# Patient Record
Sex: MALE | Race: WHITE | NOT HISPANIC OR LATINO | URBAN - METROPOLITAN AREA
[De-identification: names, ages, dates, MRNs, and addresses within clinical notes are randomized per-mention and may not be internally consistent; named-entity substitution may affect disease eponyms.]

---

## 2017-10-18 PROBLEM — 70153002 HEMORRHOIDS: Status: ACTIVE | Noted: 2017-10-18

## 2017-10-18 PROBLEM — 4556007 GASTRITIS: Status: ACTIVE | Noted: 2017-10-18

## 2017-10-18 PROBLEM — 235595009 GASTROESOPHAGEAL REFLUX DISEASE: Status: ACTIVE | Noted: 2017-10-18

## 2017-10-18 PROBLEM — 372130007 MALIGNANT NEOPLASM OF SKIN: Status: ACTIVE | Noted: 2017-10-18

## 2017-10-18 PROBLEM — 72007001 DUODENITIS: Status: ACTIVE | Noted: 2017-10-18

## 2017-10-18 PROBLEM — 37796009 MIGRAINE: Status: ACTIVE | Noted: 2017-10-18

## 2017-10-18 PROBLEM — 32914008 RESTLESS LEGS: Status: ACTIVE | Noted: 2017-10-18

## 2020-06-05 ENCOUNTER — LAB OUTSIDE AN ENCOUNTER (OUTPATIENT)
Dept: URBAN - METROPOLITAN AREA CLINIC 13 | Facility: CLINIC | Age: 65
End: 2020-06-05

## 2020-06-05 LAB
A/G RATIO: 2
ALBUMIN: (no result)
ALKALINE PHOSPHATASE: (no result)
ALT (SGPT): (no result)
AST (SGOT): (no result)
BILIRUBIN, TOTAL: (no result)
BUN/CREATININE RATIO: 12
BUN: (no result)
CALCIUM: (no result)
CARBON DIOXIDE, TOTAL: (no result)
CHLORIDE: (no result)
CREATININE: (no result)
EGFR IF AFRICN AM: (no result)
EGFR IF NONAFRICN AM: (no result)
FOLATE (FOLIC ACID), SERUM: (no result)
GLOBULIN, TOTAL: (no result)
GLUCOSE: (no result)
POTASSIUM: (no result)
PROTEIN, TOTAL: (no result)
SODIUM: (no result)
VITAMIN B12: (no result)
VITAMIN D, 25-HYDROXY: (no result)

## 2020-06-24 ENCOUNTER — OFFICE VISIT (OUTPATIENT)
Dept: URBAN - METROPOLITAN AREA CLINIC 13 | Facility: CLINIC | Age: 65
End: 2020-06-24

## 2020-07-21 ENCOUNTER — OFFICE VISIT (OUTPATIENT)
Dept: URBAN - METROPOLITAN AREA CLINIC 46 | Facility: CLINIC | Age: 65
End: 2020-07-21

## 2020-07-21 LAB — PDFREPORT1: (no result)

## 2020-07-23 ENCOUNTER — LAB OUTSIDE AN ENCOUNTER (OUTPATIENT)
Dept: URBAN - METROPOLITAN AREA CLINIC 13 | Facility: CLINIC | Age: 65
End: 2020-07-23

## 2020-07-28 ENCOUNTER — OFFICE VISIT (OUTPATIENT)
Dept: URBAN - METROPOLITAN AREA SURGERY CENTER 27 | Facility: SURGERY CENTER | Age: 65
End: 2020-07-28

## 2020-07-28 PROBLEM — 38341003 HYPERTENSION: Status: ACTIVE | Noted: 2020-07-28

## 2020-07-28 LAB — PDFREPORT1: (no result)

## 2020-07-31 ENCOUNTER — LAB OUTSIDE AN ENCOUNTER (OUTPATIENT)
Dept: URBAN - METROPOLITAN AREA CLINIC 13 | Facility: CLINIC | Age: 65
End: 2020-07-31

## 2020-08-13 ENCOUNTER — OFFICE VISIT (OUTPATIENT)
Dept: URBAN - METROPOLITAN AREA CLINIC 13 | Facility: CLINIC | Age: 65
End: 2020-08-13

## 2020-08-27 ENCOUNTER — OFFICE VISIT (OUTPATIENT)
Dept: URBAN - METROPOLITAN AREA CLINIC 44 | Facility: CLINIC | Age: 65
End: 2020-08-27

## 2020-11-19 ENCOUNTER — OFFICE VISIT (OUTPATIENT)
Dept: URBAN - METROPOLITAN AREA CLINIC 44 | Facility: CLINIC | Age: 65
End: 2020-11-19

## 2021-08-28 ENCOUNTER — TELEPHONE ENCOUNTER (OUTPATIENT)
Dept: URBAN - METROPOLITAN AREA CLINIC 13 | Facility: CLINIC | Age: 66
End: 2021-08-28

## 2021-08-28 RX ORDER — OMEPRAZOLE 40 MG/1
CAPSULE, DELAYED RELEASE ORAL
OUTPATIENT
End: 2020-07-28

## 2021-08-28 RX ORDER — HYOSCYAMINE SULFATE 0.12 MG/1
TABLET ORAL
OUTPATIENT
Start: 2018-05-01 | End: 2020-05-27

## 2021-08-28 RX ORDER — ROPINIROLE HYDROCHLORIDE 0.25 MG/1
TABLET, FILM COATED ORAL
OUTPATIENT
End: 2020-05-27

## 2021-08-28 RX ORDER — PANCRELIPASE 36000; 180000; 114000 [USP'U]/1; [USP'U]/1; [USP'U]/1
CAPSULE, DELAYED RELEASE PELLETS ORAL
OUTPATIENT
Start: 2020-05-27 | End: 2020-11-19

## 2021-08-29 ENCOUNTER — TELEPHONE ENCOUNTER (OUTPATIENT)
Dept: URBAN - METROPOLITAN AREA CLINIC 13 | Facility: CLINIC | Age: 66
End: 2021-08-29

## 2021-08-29 RX ORDER — FLUTICASONE PROPIONATE 50 UG/1
SPRAY, METERED NASAL
Status: ACTIVE | COMMUNITY

## 2021-08-29 RX ORDER — FAMOTIDINE 40 MG/1
TABLET, FILM COATED ORAL
Status: ACTIVE | COMMUNITY
Start: 2020-11-19

## 2021-08-29 RX ORDER — LINACLOTIDE 72 UG/1
CAPSULE, GELATIN COATED ORAL
Status: ACTIVE | COMMUNITY
Start: 2020-08-27

## 2021-08-29 RX ORDER — LISINOPRIL 5 MG/1
TABLET ORAL
Status: ACTIVE | COMMUNITY

## 2021-08-29 RX ORDER — BIFIDOBACTERIUM LONGUM 10MM CELL
CAPSULE ORAL
Status: ACTIVE | COMMUNITY

## 2021-08-29 RX ORDER — OMEPRAZOLE 20 MG/1
CAPSULE, DELAYED RELEASE ORAL
Status: ACTIVE | COMMUNITY
Start: 2020-05-27

## 2021-08-29 RX ORDER — SUCRALFATE 1 G/1
TABLET ORAL
Status: ACTIVE | COMMUNITY
Start: 2020-08-13

## 2021-10-06 ENCOUNTER — ERX REFILL RESPONSE (OUTPATIENT)
Dept: URBAN - METROPOLITAN AREA CLINIC 44 | Facility: CLINIC | Age: 66
End: 2021-10-06

## 2021-10-06 ENCOUNTER — TELEPHONE ENCOUNTER (OUTPATIENT)
Dept: URBAN - METROPOLITAN AREA CLINIC 46 | Facility: CLINIC | Age: 66
End: 2021-10-06

## 2021-10-06 RX ORDER — LINACLOTIDE 72 UG/1
1 CAPSULE AT LEAST 30 MINUTES BEFORE THE FIRST MEAL OF THE DAY ON AN EMPTY STOMACH CAPSULE, GELATIN COATED ORAL ONCE A DAY
Qty: 90 CAPSULE | Refills: 0 | OUTPATIENT

## 2021-10-06 RX ORDER — LINACLOTIDE 72 UG/1
TAKE 1 CAPSULE BY MOUTH 30 MIN BEFORE BREAKFAST CAPSULE, GELATIN COATED ORAL
Qty: 30 CAPSULE | Refills: 1 | OUTPATIENT

## 2021-10-06 RX ORDER — LINACLOTIDE 72 UG/1
1 CAPSULE AT LEAST 30 MINUTES BEFORE THE FIRST MEAL OF THE DAY ON AN EMPTY STOMACH CAPSULE, GELATIN COATED ORAL ONCE A DAY
Qty: 90 CAPSULE | Refills: 0
Start: 2020-08-27 | End: 2022-01-03

## 2021-10-07 ENCOUNTER — ERX REFILL RESPONSE (OUTPATIENT)
Dept: URBAN - METROPOLITAN AREA CLINIC 44 | Facility: CLINIC | Age: 66
End: 2021-10-07

## 2021-10-07 RX ORDER — LINACLOTIDE 72 UG/1
TAKE 1 CAPSULE BY MOUTH 30 MIN BEFORE BREAKFAST CAPSULE, GELATIN COATED ORAL
Qty: 30 CAPSULE | Refills: 1 | OUTPATIENT

## 2021-10-28 ENCOUNTER — WEB ENCOUNTER (OUTPATIENT)
Dept: URBAN - METROPOLITAN AREA CLINIC 44 | Facility: CLINIC | Age: 66
End: 2021-10-28

## 2021-10-29 ENCOUNTER — OFFICE VISIT (OUTPATIENT)
Dept: URBAN - METROPOLITAN AREA CLINIC 44 | Facility: CLINIC | Age: 66
End: 2021-10-29
Payer: COMMERCIAL

## 2021-10-29 VITALS
HEIGHT: 68 IN | DIASTOLIC BLOOD PRESSURE: 69 MMHG | SYSTOLIC BLOOD PRESSURE: 142 MMHG | WEIGHT: 161 LBS | HEART RATE: 61 BPM | TEMPERATURE: 98.7 F | BODY MASS INDEX: 24.4 KG/M2

## 2021-10-29 DIAGNOSIS — Z86.010 PERSONAL HISTORY OF COLONIC POLYPS: ICD-10-CM

## 2021-10-29 DIAGNOSIS — K44.9 HIATAL HERNIA: ICD-10-CM

## 2021-10-29 DIAGNOSIS — R10.13 EPIGASTRIC PRESSURE: ICD-10-CM

## 2021-10-29 PROBLEM — 428283002: Status: ACTIVE | Noted: 2021-10-29

## 2021-10-29 PROBLEM — 84089009 HIATAL HERNIA: Status: ACTIVE | Noted: 2017-10-18

## 2021-10-29 PROCEDURE — 99213 OFFICE O/P EST LOW 20 MIN: CPT | Performed by: INTERNAL MEDICINE

## 2021-10-29 RX ORDER — LINACLOTIDE 72 UG/1
TAKE 1 CAPSULE BY MOUTH 30 MIN BEFORE BREAKFAST CAPSULE, GELATIN COATED ORAL ONCE A DAY
Qty: 90 | Refills: 4 | OUTPATIENT

## 2021-10-29 RX ORDER — SUCRALFATE 1 G/1
TABLET ORAL
Status: ACTIVE | COMMUNITY
Start: 2020-08-13

## 2021-10-29 RX ORDER — FAMOTIDINE 40 MG/1
TABLET, FILM COATED ORAL
Status: ACTIVE | COMMUNITY
Start: 2020-11-19

## 2021-10-29 RX ORDER — LISINOPRIL 5 MG/1
TABLET ORAL
Status: ACTIVE | COMMUNITY

## 2021-10-29 RX ORDER — FLUTICASONE PROPIONATE 50 UG/1
SPRAY, METERED NASAL
Status: ACTIVE | COMMUNITY

## 2021-10-29 RX ORDER — LINACLOTIDE 72 UG/1
TAKE 1 CAPSULE BY MOUTH 30 MIN BEFORE BREAKFAST CAPSULE, GELATIN COATED ORAL
Qty: 30 CAPSULE | Refills: 1 | Status: ACTIVE | COMMUNITY

## 2021-10-29 RX ORDER — FAMOTIDINE 40 MG/1
1 TABLET TABLET, FILM COATED ORAL TWICE A DAY
Qty: 60 TABLET | Refills: 4 | OUTPATIENT
Start: 2020-11-19

## 2021-10-29 RX ORDER — BIFIDOBACTERIUM LONGUM 10MM CELL
CAPSULE ORAL
Status: ACTIVE | COMMUNITY

## 2021-10-29 NOTE — HPI-GERD
66 year old male with a personal history of adenomatous polyps, esophagitis. EGD 7/28/2020 showed esophagitis, fundic gland polyps and H Pylori negative gastritis. Last colonoscopy was 11/28/17 at which time 2 small adenomas were removed. Repeat colon advised in 5 years.   I saw him for follow up 8/27/20 and stopped the Creon. He stopped the Carafate on his own as he was not sure it was helping. He is taking Linzess 72 mcg. He has been holding it when he is unable to get to a bathroom easily. Currently, taking Linzess 2 to 3 times a week and this is working. He has intermittent sensation of being punched in the epigastric region. He has been having this discomfort every day this past week. Has not tried Carafate or any OTC meds. His mother-in-law is in the Hospital. She is quite sick with a fib, CHF, bleeding AVMs. He is visiting the Hospital frequently and worrying about his diabetic wife.   The bloating is better. Has some bloating with meals, but better on Linzess. He has had this 40 years. He takes Align. He had a right inguinal hernia repaired 1/24/20. Follow-up exam with Dr Khan 2/2020 showed good healing, no recurrent hernia or infection. Picking up heavy objects bothers him. EGD (5/2012) revealed hiatal hernia. Last colon was November 2017 at which time a small adenoma was removed. EGD 7/28/2020 showed esophagitis, fundic gland polyps and H Pylori negative gastritis.  Labs (CBC and CMP) in April 2021 were normal

## 2022-03-08 ENCOUNTER — OFFICE VISIT (OUTPATIENT)
Dept: URBAN - METROPOLITAN AREA CLINIC 44 | Facility: CLINIC | Age: 67
End: 2022-03-08
Payer: COMMERCIAL

## 2022-03-08 ENCOUNTER — LAB OUTSIDE AN ENCOUNTER (OUTPATIENT)
Dept: URBAN - METROPOLITAN AREA CLINIC 44 | Facility: CLINIC | Age: 67
End: 2022-03-08

## 2022-03-08 VITALS
BODY MASS INDEX: 25.91 KG/M2 | TEMPERATURE: 97.9 F | HEIGHT: 68 IN | DIASTOLIC BLOOD PRESSURE: 78 MMHG | SYSTOLIC BLOOD PRESSURE: 130 MMHG | WEIGHT: 171 LBS | HEART RATE: 54 BPM

## 2022-03-08 DIAGNOSIS — R10.13 DYSPEPSIA: ICD-10-CM

## 2022-03-08 PROCEDURE — 99203 OFFICE O/P NEW LOW 30 MIN: CPT | Performed by: INTERNAL MEDICINE

## 2022-03-08 RX ORDER — FAMOTIDINE 40 MG/1
1 TABLET TABLET, FILM COATED ORAL TWICE A DAY
Qty: 60 TABLET | Refills: 4 | COMMUNITY
Start: 2020-11-19

## 2022-03-08 RX ORDER — LINACLOTIDE 72 UG/1
TAKE 1 CAPSULE BY MOUTH 30 MIN BEFORE BREAKFAST CAPSULE, GELATIN COATED ORAL
Refills: 4 | COMMUNITY

## 2022-03-08 RX ORDER — FLUTICASONE PROPIONATE 50 UG/1
SPRAY, METERED NASAL
COMMUNITY

## 2022-03-08 RX ORDER — LISINOPRIL 2.5 MG/1
1 TABLET TABLET ORAL ONCE A DAY
COMMUNITY

## 2022-03-08 RX ORDER — BIFIDOBACTERIUM LONGUM 10MM CELL
1 CAPSULE CAPSULE ORAL
COMMUNITY

## 2022-03-08 RX ORDER — ONDANSETRON HYDROCHLORIDE 4 MG/1
1 TABLET TABLET, FILM COATED ORAL ONCE A DAY
Qty: 30 | OUTPATIENT
Start: 2022-03-08

## 2022-03-08 NOTE — HPI-GERD
66 year old male  presents for an OV to discuss dyspepsia. The patient mentions chronic abdominal discomfort with nausea and intermittent vomiting after most meals. Associates bloating and has constipation that is controlled with trials of Linzess.  Work up for this involved an EGD (5/2012) which revealed a hiatal hernia.  EGD 7/28/2020 showed esophagitis, fundic gland polyps and H Pylori negative gastritis.  Labs (CBC and CMP) in April 2021 were normal. He is not diabetic, denies abdominal surgery or takes pain medication. His symptoms have been ongoing for several years and he has tried several OTC anti-gas medications such as gas ex, Mylicon, Pepto Bismol and antiemetics such as Zofran. He thinks he has lost about 10 lbs over the years and feels his anxiety may be playing a part as well.

## 2022-03-16 ENCOUNTER — TELEPHONE ENCOUNTER (OUTPATIENT)
Dept: URBAN - METROPOLITAN AREA CLINIC 46 | Facility: CLINIC | Age: 67
End: 2022-03-16

## 2022-03-28 ENCOUNTER — TELEPHONE ENCOUNTER (OUTPATIENT)
Dept: URBAN - METROPOLITAN AREA CLINIC 92 | Facility: CLINIC | Age: 67
End: 2022-03-28

## 2022-03-28 RX ORDER — FLUTICASONE PROPIONATE 50 UG/1
SPRAY, METERED NASAL
COMMUNITY

## 2022-03-28 RX ORDER — LINACLOTIDE 72 UG/1
TAKE 1 CAPSULE BY MOUTH 30 MIN BEFORE BREAKFAST CAPSULE, GELATIN COATED ORAL
Refills: 4 | COMMUNITY

## 2022-03-28 RX ORDER — BIFIDOBACTERIUM LONGUM 10MM CELL
1 CAPSULE CAPSULE ORAL
COMMUNITY

## 2022-03-28 RX ORDER — METOCLOPRAMIDE HYDROCHLORIDE 15 MG/.07ML
1 SPRAY 30 MINUTES BEFORE MEALS AND AT BEDTIME IN ONE NOSTRIL SPRAY NASAL
OUTPATIENT
Start: 2022-03-28

## 2022-03-28 RX ORDER — ONDANSETRON HYDROCHLORIDE 4 MG/1
1 TABLET TABLET, FILM COATED ORAL ONCE A DAY
Qty: 30 | Status: ACTIVE | COMMUNITY
Start: 2022-03-08

## 2022-03-28 RX ORDER — FAMOTIDINE 40 MG/1
1 TABLET TABLET, FILM COATED ORAL TWICE A DAY
Qty: 60 TABLET | Refills: 4 | COMMUNITY
Start: 2020-11-19

## 2022-03-28 RX ORDER — LISINOPRIL 2.5 MG/1
1 TABLET TABLET ORAL ONCE A DAY
COMMUNITY

## 2022-03-31 ENCOUNTER — WEB ENCOUNTER (OUTPATIENT)
Dept: URBAN - METROPOLITAN AREA CLINIC 44 | Facility: CLINIC | Age: 67
End: 2022-03-31

## 2022-05-03 ENCOUNTER — OFFICE VISIT (OUTPATIENT)
Dept: URBAN - METROPOLITAN AREA CLINIC 44 | Facility: CLINIC | Age: 67
End: 2022-05-03
Payer: COMMERCIAL

## 2022-05-03 ENCOUNTER — OFFICE VISIT (OUTPATIENT)
Dept: URBAN - METROPOLITAN AREA CLINIC 44 | Facility: CLINIC | Age: 67
End: 2022-05-03

## 2022-05-03 VITALS
BODY MASS INDEX: 24.67 KG/M2 | WEIGHT: 162.8 LBS | SYSTOLIC BLOOD PRESSURE: 113 MMHG | DIASTOLIC BLOOD PRESSURE: 70 MMHG | TEMPERATURE: 97.3 F | HEIGHT: 68 IN | HEART RATE: 54 BPM

## 2022-05-03 DIAGNOSIS — K31.84 GASTROPARESIS: ICD-10-CM

## 2022-05-03 DIAGNOSIS — R10.13 DYSPEPSIA: ICD-10-CM

## 2022-05-03 PROCEDURE — 99213 OFFICE O/P EST LOW 20 MIN: CPT | Performed by: INTERNAL MEDICINE

## 2022-05-03 RX ORDER — ONDANSETRON HYDROCHLORIDE 4 MG/1
1 TABLET TABLET, FILM COATED ORAL ONCE A DAY
Qty: 30 | Status: DISCONTINUED | COMMUNITY
Start: 2022-03-08

## 2022-05-03 RX ORDER — FLUTICASONE PROPIONATE 50 UG/1
SPRAY, METERED NASAL
Status: ACTIVE | COMMUNITY

## 2022-05-03 RX ORDER — LINACLOTIDE 72 UG/1
TAKE 1 CAPSULE BY MOUTH 30 MIN BEFORE BREAKFAST CAPSULE, GELATIN COATED ORAL
Refills: 4 | Status: ACTIVE | COMMUNITY

## 2022-05-03 RX ORDER — PROCHLORPERAZINE MALEATE 5 MG/1
1 TABLET AS NEEDED TABLET ORAL THREE TIMES A DAY
Qty: 180 TABLET | Refills: 1 | OUTPATIENT

## 2022-05-03 RX ORDER — FAMOTIDINE 40 MG/1
1 TABLET TABLET, FILM COATED ORAL TWICE A DAY
Qty: 60 TABLET | Refills: 4 | Status: ACTIVE | COMMUNITY
Start: 2020-11-19

## 2022-05-03 RX ORDER — LISINOPRIL 2.5 MG/1
1 TABLET TABLET ORAL ONCE A DAY
Status: ACTIVE | COMMUNITY

## 2022-05-03 RX ORDER — BIFIDOBACTERIUM LONGUM 10MM CELL
1 CAPSULE CAPSULE ORAL
Status: ACTIVE | COMMUNITY

## 2022-05-03 NOTE — HPI-GERD
66 year old male  presents for an OV to discuss dyspepsia with recent evidence of gastroparesis on GES. We discussed use of prokinetic agents but he read some side effect profiles and wishes to try lifestyle modification.   The patient mentions chronic abdominal discomfort with nausea and intermittent vomiting after most meals. Associates bloating and has constipation that is controlled with trials of Linzess.  He tried blended meals, smaller portioned meals and spaced out the timing of meals.  This did not help.  We also tried Zofran but this caused constipation.   Work up for this involved an EGD (5/2012) which revealed a hiatal hernia.  EGD 7/28/2020 showed esophagitis, fundic gland polyps and H Pylori negative gastritis.  Labs (CBC and CMP) in April 2021 were normal. He is not diabetic, denies abdominal surgery or takes pain medication. His symptoms have been ongoing for several years and he has tried several OTC anti-gas medications such as gas ex, Mylicon, Pepto Bismol.  He thinks he has lost about 10 lbs over the years and feels his anxiety may be playing a part as well.  Today, we revisited the idea of different medications to try including other antiemetics and prokinetics.

## 2022-07-12 ENCOUNTER — OFFICE VISIT (OUTPATIENT)
Dept: URBAN - METROPOLITAN AREA CLINIC 44 | Facility: CLINIC | Age: 67
End: 2022-07-12
Payer: COMMERCIAL

## 2022-07-12 VITALS
HEART RATE: 55 BPM | SYSTOLIC BLOOD PRESSURE: 125 MMHG | WEIGHT: 162.8 LBS | HEIGHT: 68 IN | DIASTOLIC BLOOD PRESSURE: 67 MMHG | TEMPERATURE: 98.1 F | BODY MASS INDEX: 24.67 KG/M2

## 2022-07-12 DIAGNOSIS — K31.84 GASTROPARESIS: ICD-10-CM

## 2022-07-12 DIAGNOSIS — R10.13 DYSPEPSIA: ICD-10-CM

## 2022-07-12 PROCEDURE — 99214 OFFICE O/P EST MOD 30 MIN: CPT | Performed by: INTERNAL MEDICINE

## 2022-07-12 RX ORDER — PROCHLORPERAZINE MALEATE 5 MG/1
1 TABLET AS NEEDED TABLET ORAL THREE TIMES A DAY
Qty: 180 TABLET | Refills: 1 | Status: ACTIVE | COMMUNITY

## 2022-07-12 RX ORDER — PEPPERMINT OIL 90 MG
AS DIRECTED CAPSULE, DELAYED, AND EXTENDED RELEASE ORAL
Status: ACTIVE | COMMUNITY

## 2022-07-12 RX ORDER — LISINOPRIL 2.5 MG/1
1 TABLET TABLET ORAL ONCE A DAY
Status: ACTIVE | COMMUNITY

## 2022-07-12 RX ORDER — FAMOTIDINE 20 MG/1
1 TABLET TABLET, FILM COATED ORAL TWICE A DAY
Status: ACTIVE | COMMUNITY

## 2022-07-12 RX ORDER — FLUTICASONE PROPIONATE 50 UG/1
SPRAY, METERED NASAL
Status: ACTIVE | COMMUNITY

## 2022-07-12 RX ORDER — AMITRIPTYLINE HYDROCHLORIDE 25 MG/1
1 TABLET AT BEDTIME TABLET, FILM COATED ORAL ONCE A DAY
Qty: 60 TABLET | Refills: 2 | OUTPATIENT
Start: 2022-07-12

## 2022-07-12 RX ORDER — PROCHLORPERAZINE MALEATE 5 MG/1
1 TABLET AS NEEDED TABLET ORAL THREE TIMES A DAY
Qty: 180 TABLET | Refills: 1 | OUTPATIENT

## 2022-07-12 RX ORDER — LINACLOTIDE 72 UG/1
TAKE 1 CAPSULE BY MOUTH 30 MIN BEFORE BREAKFAST CAPSULE, GELATIN COATED ORAL
Refills: 4 | Status: ACTIVE | COMMUNITY

## 2022-07-12 NOTE — HPI-GERD
67 year old male returns for an OV to discuss dyspepsia with recent evidence of gastroparesis on GES. We discussed use of prokinetic agents, but he read some side effect profiles and wished to try lifestyle modification. However, on our last OV he wished to revisit this idea and then decided to try an antiemetic. We initiated  prochlorperazine 5 mg but the patient admits to only taking the medication prn.  He admits to "playing doctor" and has significant anxiety with trying medications. He continues to have nausea and abdominal cramping mostly at night.   Today, he mentions that he continues to take FD guard and Pepcid for abdominal dyspepsia. He eats dinner around 5 or 6 and goes to bed around 10 or so.   He admits that anxiety is a problem, and he thinks about his chronic condition greatly.   Work up for this involved an EGD (5/2012) which revealed a hiatal hernia.  EGD 7/28/2020 showed esophagitis, fundic gland polyps and H Pylori negative gastritis.  Labs (CBC and CMP) in April 2021 were normal. He is not diabetic, denies abdominal surgery or takes pain medication. His symptoms have been ongoing for several years, and he has tried several OTC anti-gas medications such as gas ex, Mylicon, Pepto Bismol.  He thinks he has lost about 10 lbs over the years and feels his anxiety may be playing a part as well.

## 2022-09-07 ENCOUNTER — ERX REFILL RESPONSE (OUTPATIENT)
Dept: URBAN - METROPOLITAN AREA CLINIC 44 | Facility: CLINIC | Age: 67
End: 2022-09-07

## 2022-09-07 RX ORDER — AMITRIPTYLINE HYDROCHLORIDE 25 MG/1
1 TABLET AT BEDTIME TABLET, FILM COATED ORAL ONCE A DAY
Qty: 60 TABLET | Refills: 2 | OUTPATIENT

## 2022-09-07 RX ORDER — AMITRIPTYLINE HYDROCHLORIDE 25 MG/1
1 TABLET AT BEDTIME ORALLY ONCE A DAY 60 DAYS TABLET, FILM COATED ORAL
Qty: 60 TABLET | Refills: 2 | OUTPATIENT

## 2022-09-13 ENCOUNTER — OFFICE VISIT (OUTPATIENT)
Dept: URBAN - METROPOLITAN AREA CLINIC 44 | Facility: CLINIC | Age: 67
End: 2022-09-13

## 2022-09-13 RX ORDER — PROCHLORPERAZINE MALEATE 5 MG/1
1 TABLET AS NEEDED TABLET ORAL THREE TIMES A DAY
Qty: 180 TABLET | Refills: 1 | COMMUNITY

## 2022-09-13 RX ORDER — LINACLOTIDE 72 UG/1
TAKE 1 CAPSULE BY MOUTH 30 MIN BEFORE BREAKFAST CAPSULE, GELATIN COATED ORAL
Refills: 4 | COMMUNITY

## 2022-09-13 RX ORDER — FAMOTIDINE 20 MG/1
1 TABLET TABLET, FILM COATED ORAL TWICE A DAY
COMMUNITY

## 2022-09-13 RX ORDER — PEPPERMINT OIL 90 MG
AS DIRECTED CAPSULE, DELAYED, AND EXTENDED RELEASE ORAL
COMMUNITY

## 2022-09-13 RX ORDER — FLUTICASONE PROPIONATE 50 UG/1
SPRAY, METERED NASAL
COMMUNITY

## 2022-09-13 RX ORDER — AMITRIPTYLINE HYDROCHLORIDE 25 MG/1
1 TABLET AT BEDTIME ORALLY ONCE A DAY 60 DAYS TABLET, FILM COATED ORAL
Qty: 60 TABLET | Refills: 2 | COMMUNITY

## 2022-09-13 RX ORDER — LISINOPRIL 2.5 MG/1
1 TABLET TABLET ORAL ONCE A DAY
COMMUNITY

## 2022-12-26 ENCOUNTER — WEB ENCOUNTER (OUTPATIENT)
Dept: URBAN - METROPOLITAN AREA CLINIC 44 | Facility: CLINIC | Age: 67
End: 2022-12-26

## 2022-12-26 RX ORDER — LINACLOTIDE 72 UG/1
TAKE 1 CAPSULE BY MOUTH 30 MIN BEFORE BREAKFAST CAPSULE, GELATIN COATED ORAL ONCE A DAY
Qty: 90 CAPSULE | Refills: 3

## 2022-12-30 ENCOUNTER — OFFICE VISIT (OUTPATIENT)
Dept: URBAN - METROPOLITAN AREA CLINIC 46 | Facility: CLINIC | Age: 67
End: 2022-12-30
Payer: COMMERCIAL

## 2022-12-30 ENCOUNTER — LAB OUTSIDE AN ENCOUNTER (OUTPATIENT)
Dept: URBAN - METROPOLITAN AREA CLINIC 46 | Facility: CLINIC | Age: 67
End: 2022-12-30

## 2022-12-30 VITALS
DIASTOLIC BLOOD PRESSURE: 64 MMHG | SYSTOLIC BLOOD PRESSURE: 123 MMHG | WEIGHT: 98.2 LBS | HEART RATE: 61 BPM | HEIGHT: 68 IN | BODY MASS INDEX: 14.88 KG/M2 | TEMPERATURE: 98.2 F

## 2022-12-30 DIAGNOSIS — R10.13 DYSPEPSIA: ICD-10-CM

## 2022-12-30 DIAGNOSIS — D12.4 BENIGN NEOPLASM OF DESCENDING COLON: ICD-10-CM

## 2022-12-30 DIAGNOSIS — K31.84 GASTROPARESIS: ICD-10-CM

## 2022-12-30 PROBLEM — 235675006: Status: ACTIVE | Noted: 2022-05-03

## 2022-12-30 PROCEDURE — 99214 OFFICE O/P EST MOD 30 MIN: CPT | Performed by: INTERNAL MEDICINE

## 2022-12-30 RX ORDER — PEPPERMINT OIL 90 MG
AS DIRECTED CAPSULE, DELAYED, AND EXTENDED RELEASE ORAL
Status: ACTIVE | COMMUNITY

## 2022-12-30 RX ORDER — FAMOTIDINE 20 MG/1
1 TABLET TABLET, FILM COATED ORAL TWICE A DAY
Status: ACTIVE | COMMUNITY

## 2022-12-30 RX ORDER — SODIUM SULFATE, MAGNESIUM SULFATE, AND POTASSIUM CHLORIDE 17.75; 2.7; 2.25 G/1; G/1; G/1
12 TABLETS TABLET ORAL
Qty: 24 TABLETS | Refills: 0 | OUTPATIENT
Start: 2022-12-30 | End: 2022-12-31

## 2022-12-30 RX ORDER — PROCHLORPERAZINE MALEATE 5 MG/1
1 TABLET AS NEEDED TABLET ORAL THREE TIMES A DAY
Qty: 180 TABLET | Refills: 1 | OUTPATIENT

## 2022-12-30 RX ORDER — LINACLOTIDE 72 UG/1
TAKE 1 CAPSULE BY MOUTH 30 MIN BEFORE BREAKFAST CAPSULE, GELATIN COATED ORAL ONCE A DAY
Qty: 90 CAPSULE | Refills: 3 | Status: ACTIVE | COMMUNITY

## 2022-12-30 RX ORDER — FLUTICASONE PROPIONATE 50 UG/1
SPRAY, METERED NASAL
Status: ACTIVE | COMMUNITY

## 2022-12-30 RX ORDER — LISINOPRIL 2.5 MG/1
1 TABLET TABLET ORAL ONCE A DAY
Status: ACTIVE | COMMUNITY

## 2022-12-30 NOTE — HPI-GERD
67 year old male returns for an OV to discuss chronic gastroparesis with dyspepsia symptoms of nausea and vomiting. We have discussed treatment options with the patient but over the last few visits he as elected not to start medications.   We discussed use of prokinetic agents, but he read some side effect profiles and wished to try lifestyle modification. However, on our last OV he wished to revisit this idea and then decided to try an antiemetic. We initiated  prochlorperazine 5 mg but the patient admits to not taking the medication.  He admits to "playing doctor" and has significant anxiety with trying medications. He continues to have nausea and abdominal cramping mostly at night. He only takes FD guard intermittently with mixed results.   Today, he mentions severe anxiety and family stressors. He admits that anxiety is a problem, and he thinks about his chronic condition greatly. He continues to take Linzess 72 mcg with good results(daily BM's).    Work up for this involved an EGD (5/2012) which revealed a hiatal hernia.  EGD 7/28/2020 showed esophagitis, fundic gland polyps and H Pylori negative gastritis.  Labs (CBC and CMP) in April 2021 were normal. He is not diabetic, denies abdominal surgery or takes pain medication. His symptoms have been ongoing for several years, and he has tried several OTC anti-gas medications such as gas ex, Mylicon, Pepto Bismol.  He thinks he has lost about 10 lbs over the years and feels his anxiety may be playing a part as well.

## 2023-01-10 ENCOUNTER — OFFICE VISIT (OUTPATIENT)
Dept: URBAN - METROPOLITAN AREA SURGERY CENTER 28 | Facility: SURGERY CENTER | Age: 68
End: 2023-01-10

## 2023-01-19 ENCOUNTER — OFFICE VISIT (OUTPATIENT)
Dept: URBAN - METROPOLITAN AREA SURGERY CENTER 27 | Facility: SURGERY CENTER | Age: 68
End: 2023-01-19
Payer: COMMERCIAL

## 2023-01-19 DIAGNOSIS — Z09 CARDIOLOGY FOLLOW-UP ENCOUNTER: ICD-10-CM

## 2023-01-19 DIAGNOSIS — Z86.010 ADENOMAS PERSONAL HISTORY OF COLONIC POLYPS: ICD-10-CM

## 2023-01-19 PROCEDURE — G0105 COLORECTAL SCRN; HI RISK IND: HCPCS | Performed by: INTERNAL MEDICINE

## 2023-01-19 PROCEDURE — G8907 PT DOC NO EVENTS ON DISCHARG: HCPCS | Performed by: INTERNAL MEDICINE

## 2023-01-19 RX ORDER — PEPPERMINT OIL 90 MG
AS DIRECTED CAPSULE, DELAYED, AND EXTENDED RELEASE ORAL
Status: ACTIVE | COMMUNITY

## 2023-01-19 RX ORDER — FLUTICASONE PROPIONATE 50 UG/1
SPRAY, METERED NASAL
Status: ACTIVE | COMMUNITY

## 2023-01-19 RX ORDER — FAMOTIDINE 20 MG/1
1 TABLET TABLET, FILM COATED ORAL TWICE A DAY
Status: ACTIVE | COMMUNITY

## 2023-01-19 RX ORDER — PROCHLORPERAZINE MALEATE 5 MG/1
1 TABLET AS NEEDED TABLET ORAL THREE TIMES A DAY
Qty: 180 TABLET | Refills: 1 | Status: ACTIVE | COMMUNITY

## 2023-01-19 RX ORDER — LINACLOTIDE 72 UG/1
TAKE 1 CAPSULE BY MOUTH 30 MIN BEFORE BREAKFAST CAPSULE, GELATIN COATED ORAL ONCE A DAY
Qty: 90 CAPSULE | Refills: 3 | Status: ACTIVE | COMMUNITY

## 2023-01-19 RX ORDER — LISINOPRIL 2.5 MG/1
1 TABLET TABLET ORAL ONCE A DAY
Status: ACTIVE | COMMUNITY

## 2023-01-25 ENCOUNTER — ERX REFILL RESPONSE (OUTPATIENT)
Dept: URBAN - METROPOLITAN AREA CLINIC 44 | Facility: CLINIC | Age: 68
End: 2023-01-25

## 2023-01-25 RX ORDER — LINACLOTIDE 72 UG/1
TAKE 1 CAPSULE BY MOUTH 30 MIN BEFORE BREAKFAST CAPSULE, GELATIN COATED ORAL ONCE A DAY
Qty: 90 CAPSULE | Refills: 3 | OUTPATIENT

## 2023-01-25 RX ORDER — LINACLOTIDE 72 UG/1
TAKE 1 CAPSULE BY MOUTH 30 MIN BEFORE BREAKFAST CAPSULE, GELATIN COATED ORAL ONCE A DAY
Qty: 90 CAPSULE | Refills: 0 | OUTPATIENT

## 2023-03-29 ENCOUNTER — WEB ENCOUNTER (OUTPATIENT)
Dept: URBAN - METROPOLITAN AREA CLINIC 44 | Facility: CLINIC | Age: 68
End: 2023-03-29

## 2023-03-30 ENCOUNTER — WEB ENCOUNTER (OUTPATIENT)
Dept: URBAN - METROPOLITAN AREA CLINIC 44 | Facility: CLINIC | Age: 68
End: 2023-03-30

## 2023-04-21 ENCOUNTER — OFFICE VISIT (OUTPATIENT)
Dept: URBAN - METROPOLITAN AREA CLINIC 46 | Facility: CLINIC | Age: 68
End: 2023-04-21
Payer: COMMERCIAL

## 2023-04-21 VITALS
DIASTOLIC BLOOD PRESSURE: 54 MMHG | TEMPERATURE: 98.2 F | SYSTOLIC BLOOD PRESSURE: 122 MMHG | HEART RATE: 65 BPM | HEIGHT: 68 IN | BODY MASS INDEX: 24.58 KG/M2 | WEIGHT: 162.2 LBS | OXYGEN SATURATION: 99 %

## 2023-04-21 DIAGNOSIS — K31.84 GASTROPARESIS: ICD-10-CM

## 2023-04-21 DIAGNOSIS — K58.1 IRRITABLE BOWEL SYNDROME WITH CONSTIPATION: ICD-10-CM

## 2023-04-21 PROBLEM — 440630006: Status: ACTIVE | Noted: 2023-04-21

## 2023-04-21 PROCEDURE — 99213 OFFICE O/P EST LOW 20 MIN: CPT | Performed by: INTERNAL MEDICINE

## 2023-04-21 RX ORDER — LINACLOTIDE 72 UG/1
TAKE 1 CAPSULE BY MOUTH 30 MIN BEFORE BREAKFAST CAPSULE, GELATIN COATED ORAL ONCE A DAY
Qty: 90 CAPSULE | Refills: 0 | Status: ACTIVE | COMMUNITY

## 2023-04-21 RX ORDER — CHOLECALCIFEROL (VITAMIN D3) 50 MCG
1 TABLET TABLET ORAL ONCE A DAY
Status: ACTIVE | COMMUNITY

## 2023-04-21 RX ORDER — LISINOPRIL 2.5 MG/1
1 TABLET TABLET ORAL ONCE A DAY
Status: ACTIVE | COMMUNITY

## 2023-04-21 RX ORDER — PROCHLORPERAZINE MALEATE 5 MG/1
1 TABLET AS NEEDED TABLET ORAL THREE TIMES A DAY
Qty: 180 TABLET | Refills: 1 | Status: ACTIVE | COMMUNITY

## 2023-04-21 RX ORDER — PEPPERMINT OIL 90 MG
AS DIRECTED CAPSULE, DELAYED, AND EXTENDED RELEASE ORAL
Status: ACTIVE | COMMUNITY

## 2023-04-21 RX ORDER — FAMOTIDINE 20 MG/1
1 TABLET TABLET, FILM COATED ORAL TWICE A DAY
Status: ACTIVE | COMMUNITY

## 2023-04-21 NOTE — HPI-GERD
67 year old male returns for an OV to discuss chronic gastroparesis with dyspepsia symptoms of nausea and vomiting. We have discussed treatment options with the patient over several months;however, he has been hesistant to start recommended treatment due to percieved side effects.     We discussed use of prokinetic agents, but he read the side effect profiles and wished to try lifestyle modification. However, on subsequent visits he decided to try an antiemetic. We initiated  prochlorperazine 5 mg but the patient admited to not taking the medication.  He has significant anxiety with new medications but he continues to have nausea and abdominal cramping mostly at night.  Trials of FD guard taken intermittently has mixed results.   On the last visit, he agreed to try prochlorperazine 5 mg x bid and has done remarkably well. However, he states that he was off for a two weeks and felt worsening symptoms. He mentions that he has been having He continues to take Linzess 72 mcg on T, Th, Sat with mixed results. Endorses cramping but has BMs on the days he takes the medication.    Brief history with work up : EGD (5/2012) which revealed a hiatal hernia.   EGD 7/28/2020 showed esophagitis, fundic gland polyps and H Pylori negative gastritis.  CBC and CMP) in April 2021 were normal. He is not diabetic, denies abdominal surgery or takes pain medication. His symptoms have been ongoing for several years, and he has tried several OTC anti-gas medications such as gas ex, Mylicon, Pepto Bismol.

## 2023-06-07 ENCOUNTER — WEB ENCOUNTER (OUTPATIENT)
Dept: URBAN - METROPOLITAN AREA CLINIC 46 | Facility: CLINIC | Age: 68
End: 2023-06-07

## 2023-06-07 RX ORDER — LINACLOTIDE 72 UG/1
TAKE 1 CAPSULE BY MOUTH 30 MIN BEFORE BREAKFAST CAPSULE, GELATIN COATED ORAL ONCE A DAY
Qty: 90 CAPSULE | Refills: 0 | Status: ACTIVE | COMMUNITY

## 2023-06-07 RX ORDER — FAMOTIDINE 20 MG/1
1 TABLET TABLET, FILM COATED ORAL TWICE A DAY
Status: ACTIVE | COMMUNITY

## 2023-06-07 RX ORDER — TAMSULOSIN HYDROCHLORIDE 0.4 MG/1
1 CAPSULE CAPSULE ORAL ONCE A DAY
Qty: 30 CAPSULE | Status: ACTIVE | COMMUNITY

## 2023-06-07 RX ORDER — LISINOPRIL 2.5 MG/1
1 TABLET TABLET ORAL ONCE A DAY
Status: ACTIVE | COMMUNITY

## 2023-06-07 RX ORDER — PEPPERMINT OIL 90 MG
AS DIRECTED CAPSULE, DELAYED, AND EXTENDED RELEASE ORAL
Status: ACTIVE | COMMUNITY

## 2023-06-07 RX ORDER — CHOLECALCIFEROL (VITAMIN D3) 50 MCG
1 TABLET TABLET ORAL ONCE A DAY
Status: ACTIVE | COMMUNITY

## 2023-06-07 RX ORDER — PROCHLORPERAZINE MALEATE 5 MG/1
1 TABLET AS NEEDED TABLET ORAL THREE TIMES A DAY
Qty: 180 TABLET | Refills: 1 | Status: ACTIVE | COMMUNITY

## 2023-06-09 ENCOUNTER — LAB OUTSIDE AN ENCOUNTER (OUTPATIENT)
Dept: URBAN - METROPOLITAN AREA CLINIC 46 | Facility: CLINIC | Age: 68
End: 2023-06-09

## 2023-06-09 PROBLEM — 87522002: Status: ACTIVE | Noted: 2023-06-09

## 2023-06-22 ENCOUNTER — CLAIMS CREATED FROM THE CLAIM WINDOW (OUTPATIENT)
Dept: URBAN - METROPOLITAN AREA SURGERY CENTER 27 | Facility: SURGERY CENTER | Age: 68
End: 2023-06-22

## 2023-06-22 ENCOUNTER — TELEPHONE ENCOUNTER (OUTPATIENT)
Dept: URBAN - METROPOLITAN AREA CLINIC 44 | Facility: CLINIC | Age: 68
End: 2023-06-22

## 2023-06-22 ENCOUNTER — CLAIMS CREATED FROM THE CLAIM WINDOW (OUTPATIENT)
Dept: URBAN - METROPOLITAN AREA CLINIC 4 | Facility: CLINIC | Age: 68
End: 2023-06-22
Payer: COMMERCIAL

## 2023-06-22 ENCOUNTER — CLAIMS CREATED FROM THE CLAIM WINDOW (OUTPATIENT)
Dept: URBAN - METROPOLITAN AREA SURGERY CENTER 27 | Facility: SURGERY CENTER | Age: 68
End: 2023-06-22
Payer: COMMERCIAL

## 2023-06-22 DIAGNOSIS — K29.70 CHRONIC ACITVE GASTRITIS (H.PYLORI NEGATIVE): ICD-10-CM

## 2023-06-22 DIAGNOSIS — K92.2 ACUTE GASTROINTESTINAL BLEEDING: ICD-10-CM

## 2023-06-22 DIAGNOSIS — D12.5 ADENOMA OF SIGMOID COLON: ICD-10-CM

## 2023-06-22 DIAGNOSIS — Z12.11 COLON CANCER SCREENING (HIGH RISK): ICD-10-CM

## 2023-06-22 DIAGNOSIS — D12.2 ADENOMA OF ASCENDING COLON: ICD-10-CM

## 2023-06-22 DIAGNOSIS — D50.9 ANEMIA: ICD-10-CM

## 2023-06-22 DIAGNOSIS — K29.70 GASTRITIS, UNSPECIFIED, WITHOUT BLEEDING: ICD-10-CM

## 2023-06-22 DIAGNOSIS — D12.4 ADENOMA OF DESCENDING COLON: ICD-10-CM

## 2023-06-22 PROCEDURE — G8907 PT DOC NO EVENTS ON DISCHARG: HCPCS | Performed by: INTERNAL MEDICINE

## 2023-06-22 PROCEDURE — 00811 ANES LWR INTST NDSC NOS: CPT | Performed by: NURSE ANESTHETIST, CERTIFIED REGISTERED

## 2023-06-22 PROCEDURE — 43239 EGD BIOPSY SINGLE/MULTIPLE: CPT | Performed by: INTERNAL MEDICINE

## 2023-06-22 PROCEDURE — 88312 SPECIAL STAINS GROUP 1: CPT | Performed by: PATHOLOGY

## 2023-06-22 PROCEDURE — 88305 TISSUE EXAM BY PATHOLOGIST: CPT | Performed by: PATHOLOGY

## 2023-06-22 RX ORDER — LINACLOTIDE 72 UG/1
TAKE 1 CAPSULE BY MOUTH 30 MIN BEFORE BREAKFAST CAPSULE, GELATIN COATED ORAL ONCE A DAY
Qty: 90 CAPSULE | Refills: 0 | Status: ACTIVE | COMMUNITY

## 2023-06-22 RX ORDER — LISINOPRIL 2.5 MG/1
1 TABLET TABLET ORAL ONCE A DAY
Status: ACTIVE | COMMUNITY

## 2023-06-22 RX ORDER — PANTOPRAZOLE SODIUM 40 MG/1
1 TABLET TABLET, DELAYED RELEASE ORAL ONCE A DAY
Qty: 60 TABLET | Refills: 3 | OUTPATIENT
Start: 2023-06-22

## 2023-06-22 RX ORDER — PROCHLORPERAZINE MALEATE 5 MG/1
1 TABLET AS NEEDED TABLET ORAL THREE TIMES A DAY
Qty: 180 TABLET | Refills: 1 | Status: ACTIVE | COMMUNITY

## 2023-06-22 RX ORDER — CHOLECALCIFEROL (VITAMIN D3) 50 MCG
1 TABLET TABLET ORAL ONCE A DAY
Status: ACTIVE | COMMUNITY

## 2023-06-22 RX ORDER — FAMOTIDINE 20 MG/1
1 TABLET TABLET, FILM COATED ORAL TWICE A DAY
Status: ACTIVE | COMMUNITY

## 2023-06-22 RX ORDER — PEPPERMINT OIL 90 MG
AS DIRECTED CAPSULE, DELAYED, AND EXTENDED RELEASE ORAL
Status: ACTIVE | COMMUNITY

## 2023-06-22 RX ORDER — TAMSULOSIN HYDROCHLORIDE 0.4 MG/1
1 CAPSULE CAPSULE ORAL ONCE A DAY
Qty: 30 CAPSULE | Status: ACTIVE | COMMUNITY

## 2023-07-26 ENCOUNTER — LAB OUTSIDE AN ENCOUNTER (OUTPATIENT)
Dept: URBAN - METROPOLITAN AREA CLINIC 48 | Facility: CLINIC | Age: 68
End: 2023-07-26

## 2023-07-26 ENCOUNTER — OFFICE VISIT (OUTPATIENT)
Dept: URBAN - METROPOLITAN AREA CLINIC 48 | Facility: CLINIC | Age: 68
End: 2023-07-26
Payer: COMMERCIAL

## 2023-07-26 VITALS
HEART RATE: 48 BPM | HEIGHT: 68 IN | SYSTOLIC BLOOD PRESSURE: 129 MMHG | WEIGHT: 162.2 LBS | DIASTOLIC BLOOD PRESSURE: 71 MMHG | BODY MASS INDEX: 24.58 KG/M2 | TEMPERATURE: 97.7 F

## 2023-07-26 DIAGNOSIS — K58.1 IRRITABLE BOWEL SYNDROME WITH CONSTIPATION: ICD-10-CM

## 2023-07-26 DIAGNOSIS — K31.84 GASTROPARESIS: ICD-10-CM

## 2023-07-26 PROCEDURE — 99214 OFFICE O/P EST MOD 30 MIN: CPT | Performed by: INTERNAL MEDICINE

## 2023-07-26 RX ORDER — TAMSULOSIN HYDROCHLORIDE 0.4 MG/1
1 CAPSULE CAPSULE ORAL ONCE A DAY
Qty: 30 CAPSULE | Status: ACTIVE | COMMUNITY

## 2023-07-26 RX ORDER — PANTOPRAZOLE SODIUM 40 MG/1
1 TABLET TABLET, DELAYED RELEASE ORAL ONCE A DAY
Qty: 60 TABLET | Refills: 3 | Status: ACTIVE | COMMUNITY
Start: 2023-06-22

## 2023-07-26 RX ORDER — FAMOTIDINE 20 MG/1
1 TABLET TABLET, FILM COATED ORAL TWICE A DAY
Status: ACTIVE | COMMUNITY

## 2023-07-26 RX ORDER — LISINOPRIL 2.5 MG/1
1 TABLET TABLET ORAL ONCE A DAY
Status: ACTIVE | COMMUNITY

## 2023-07-26 RX ORDER — PEPPERMINT OIL 90 MG
AS DIRECTED CAPSULE, DELAYED, AND EXTENDED RELEASE ORAL
Status: ACTIVE | COMMUNITY

## 2023-07-26 RX ORDER — CHOLECALCIFEROL (VITAMIN D3) 50 MCG
1 TABLET TABLET ORAL ONCE A DAY
Status: ACTIVE | COMMUNITY

## 2023-07-26 RX ORDER — LINACLOTIDE 72 UG/1
TAKE 1 CAPSULE BY MOUTH 30 MIN BEFORE BREAKFAST CAPSULE, GELATIN COATED ORAL ONCE A DAY
Qty: 90 CAPSULE | Refills: 0 | Status: ACTIVE | COMMUNITY

## 2023-07-26 RX ORDER — PROCHLORPERAZINE MALEATE 5 MG/1
1 TABLET AS NEEDED TABLET ORAL THREE TIMES A DAY
Qty: 180 TABLET | Refills: 1 | Status: ACTIVE | COMMUNITY

## 2023-07-26 NOTE — HPI-GERD
68 year old male returns for an OV to discuss chronic gastroparesis with dyspepsia symptoms of nausea and vomiting.  He has a long history of gastritis and stomach bleeding that was further described as gastritis.  We have discussed treatment options with the patient over several months;however, he has been hesistant to start recommended treatment due to percieved side effects.     We discussed use of prokinetic agents, but he read the side effect profiles and wished to try lifestyle modification. However, on subsequent visits he decided to try an antiemetic. We initiated  prochlorperazine 5 mg but the patient admited to not taking the medication.  He has significant anxiety with new medications but he continues to have nausea and abdominal cramping mostly at night.  Trials of FD guard taken intermittently has mixed results.   On the last visit, he agreed to try prochlorperazine 5 mg x bid and has done remarkably well. However, he menitons that he ate a cheeseburger last week that caused signifcant nausea and vomiting.  He continues to take Linzess 72 mcg on T, Th, Sat with mixed results. Endorses cramping but has BMs on the days he takes the medication.    Brief history with work up : EGD (5/2012) which revealed a hiatal hernia.   EGD 7/28/2020 showed esophagitis, fundic gland polyps and H Pylori negative gastritis.  Colonoscopy 1/23 showed diverticulosis.  EGD 6/23 revealed hemorrhagic gastritis.   CBC and CMP) in April 2021 were normal. He is not diabetic, denies abdominal surgery or takes pain medication. His symptoms have been ongoing for several years, and he has tried several OTC anti-gas medications such as gas ex, Mylicon, Pepto Bismol.

## 2023-08-03 ENCOUNTER — WEB ENCOUNTER (OUTPATIENT)
Dept: URBAN - METROPOLITAN AREA CLINIC 44 | Facility: CLINIC | Age: 68
End: 2023-08-03

## 2023-08-14 ENCOUNTER — WEB ENCOUNTER (OUTPATIENT)
Dept: URBAN - METROPOLITAN AREA CLINIC 44 | Facility: CLINIC | Age: 68
End: 2023-08-14

## 2023-08-14 ENCOUNTER — ERX REFILL RESPONSE (OUTPATIENT)
Dept: URBAN - METROPOLITAN AREA CLINIC 44 | Facility: CLINIC | Age: 68
End: 2023-08-14

## 2023-08-14 RX ORDER — PANTOPRAZOLE SODIUM 40 MG/1
TAKE 1 TABLET BY MOUTH EVERY DAY FOR 60 DAYS TABLET, DELAYED RELEASE ORAL
Qty: 60 TABLET | Refills: 3 | OUTPATIENT

## 2023-08-14 RX ORDER — PANTOPRAZOLE SODIUM 40 MG/1
1 TABLET TABLET, DELAYED RELEASE ORAL ONCE A DAY
Qty: 60 TABLET | Refills: 3 | OUTPATIENT

## 2023-08-21 ENCOUNTER — ERX REFILL RESPONSE (OUTPATIENT)
Dept: URBAN - METROPOLITAN AREA CLINIC 44 | Facility: CLINIC | Age: 68
End: 2023-08-21

## 2023-08-21 RX ORDER — PROCHLORPERAZINE MALEATE 5 MG/1
TAKE 1 TABLET BY MOUTH AS NEEDED 3 TIMES A DAY TABLET ORAL
Qty: 180 TABLET | Refills: 1 | OUTPATIENT

## 2023-08-21 RX ORDER — PROCHLORPERAZINE MALEATE 5 MG/1
1 TABLET AS NEEDED TABLET ORAL THREE TIMES A DAY
Qty: 180 TABLET | Refills: 1 | OUTPATIENT

## 2023-08-25 ENCOUNTER — OFFICE VISIT (OUTPATIENT)
Dept: URBAN - METROPOLITAN AREA CLINIC 46 | Facility: CLINIC | Age: 68
End: 2023-08-25

## 2023-08-30 ENCOUNTER — TELEPHONE ENCOUNTER (OUTPATIENT)
Dept: URBAN - METROPOLITAN AREA CLINIC 44 | Facility: CLINIC | Age: 68
End: 2023-08-30

## 2023-08-30 ENCOUNTER — TELEPHONE ENCOUNTER (OUTPATIENT)
Dept: URBAN - METROPOLITAN AREA CLINIC 45 | Facility: CLINIC | Age: 68
End: 2023-08-30

## 2023-09-05 ENCOUNTER — OFFICE VISIT (OUTPATIENT)
Dept: URBAN - METROPOLITAN AREA CLINIC 43 | Facility: CLINIC | Age: 68
End: 2023-09-05

## 2023-09-06 ENCOUNTER — OFFICE VISIT (OUTPATIENT)
Dept: URBAN - METROPOLITAN AREA CLINIC 44 | Facility: CLINIC | Age: 68
End: 2023-09-06

## 2023-09-11 ENCOUNTER — TELEPHONE ENCOUNTER (OUTPATIENT)
Dept: URBAN - METROPOLITAN AREA CLINIC 46 | Facility: CLINIC | Age: 68
End: 2023-09-11

## 2023-10-17 ENCOUNTER — CLAIMS CREATED FROM THE CLAIM WINDOW (OUTPATIENT)
Dept: URBAN - METROPOLITAN AREA CLINIC 43 | Facility: CLINIC | Age: 68
End: 2023-10-17
Payer: COMMERCIAL

## 2023-10-17 ENCOUNTER — OFFICE VISIT (OUTPATIENT)
Dept: URBAN - METROPOLITAN AREA CLINIC 43 | Facility: CLINIC | Age: 68
End: 2023-10-17

## 2023-10-17 ENCOUNTER — CLAIMS CREATED FROM THE CLAIM WINDOW (OUTPATIENT)
Dept: URBAN - METROPOLITAN AREA CLINIC 43 | Facility: CLINIC | Age: 68
End: 2023-10-17

## 2023-10-17 VITALS
BODY MASS INDEX: 23.19 KG/M2 | HEIGHT: 68 IN | WEIGHT: 153 LBS | SYSTOLIC BLOOD PRESSURE: 143 MMHG | OXYGEN SATURATION: 100 % | HEART RATE: 51 BPM | TEMPERATURE: 97.9 F | DIASTOLIC BLOOD PRESSURE: 82 MMHG

## 2023-10-17 DIAGNOSIS — K92.0 HEMATEMESIS WITH NAUSEA: ICD-10-CM

## 2023-10-17 DIAGNOSIS — D64.9 ANEMIA, UNSPECIFIED TYPE: ICD-10-CM

## 2023-10-17 PROCEDURE — 91110 GI TRC IMG INTRAL ESOPH-ILE: CPT | Performed by: INTERNAL MEDICINE

## 2023-10-17 RX ORDER — PEPPERMINT OIL 90 MG
AS DIRECTED CAPSULE, DELAYED, AND EXTENDED RELEASE ORAL
Status: ACTIVE | COMMUNITY

## 2023-10-17 RX ORDER — LISINOPRIL 2.5 MG/1
1 TABLET TABLET ORAL ONCE A DAY
Status: ACTIVE | COMMUNITY

## 2023-10-17 RX ORDER — PROCHLORPERAZINE MALEATE 5 MG/1
TAKE 1 TABLET BY MOUTH AS NEEDED 3 TIMES A DAY TABLET ORAL
Qty: 180 TABLET | Refills: 1 | Status: ACTIVE | COMMUNITY

## 2023-10-17 RX ORDER — FAMOTIDINE 20 MG/1
1 TABLET TABLET, FILM COATED ORAL TWICE A DAY
Status: ACTIVE | COMMUNITY

## 2023-10-17 RX ORDER — PANTOPRAZOLE SODIUM 40 MG/1
TAKE 1 TABLET BY MOUTH EVERY DAY FOR 60 DAYS TABLET, DELAYED RELEASE ORAL
Qty: 60 TABLET | Refills: 3 | Status: ACTIVE | COMMUNITY

## 2023-10-17 RX ORDER — TAMSULOSIN HYDROCHLORIDE 0.4 MG/1
1 CAPSULE CAPSULE ORAL ONCE A DAY
Qty: 30 CAPSULE | Status: ACTIVE | COMMUNITY

## 2023-10-17 RX ORDER — CHOLECALCIFEROL (VITAMIN D3) 50 MCG
1 TABLET TABLET ORAL ONCE A DAY
Status: ACTIVE | COMMUNITY

## 2023-10-17 RX ORDER — LINACLOTIDE 72 UG/1
TAKE 1 CAPSULE BY MOUTH 30 MIN BEFORE BREAKFAST CAPSULE, GELATIN COATED ORAL ONCE A DAY
Qty: 90 CAPSULE | Refills: 0 | Status: ACTIVE | COMMUNITY

## 2023-10-20 ENCOUNTER — OFFICE VISIT (OUTPATIENT)
Dept: URBAN - METROPOLITAN AREA CLINIC 46 | Facility: CLINIC | Age: 68
End: 2023-10-20
Payer: COMMERCIAL

## 2023-10-20 VITALS
WEIGHT: 156.4 LBS | SYSTOLIC BLOOD PRESSURE: 160 MMHG | DIASTOLIC BLOOD PRESSURE: 78 MMHG | HEART RATE: 53 BPM | TEMPERATURE: 98.3 F | BODY MASS INDEX: 23.7 KG/M2 | HEIGHT: 68 IN

## 2023-10-20 DIAGNOSIS — K29.60 GASTRITIS, BILE ACID REFLUX: ICD-10-CM

## 2023-10-20 DIAGNOSIS — K31.84 GASTROPARESIS: ICD-10-CM

## 2023-10-20 DIAGNOSIS — K58.1 IRRITABLE BOWEL SYNDROME WITH CONSTIPATION: ICD-10-CM

## 2023-10-20 PROBLEM — 72950008: Status: ACTIVE | Noted: 2023-10-20

## 2023-10-20 PROCEDURE — 99214 OFFICE O/P EST MOD 30 MIN: CPT | Performed by: INTERNAL MEDICINE

## 2023-10-20 RX ORDER — LINACLOTIDE 72 UG/1
TAKE 1 CAPSULE BY MOUTH 30 MIN BEFORE BREAKFAST CAPSULE, GELATIN COATED ORAL ONCE A DAY
Qty: 90 CAPSULE | Refills: 0 | Status: ACTIVE | COMMUNITY

## 2023-10-20 RX ORDER — SUCRALFATE 1 G/1
1 TABLET ON AN EMPTY STOMACH TABLET ORAL TWICE A DAY
Qty: 120 TABLET | Refills: 3 | OUTPATIENT
Start: 2023-10-20 | End: 2024-06-16

## 2023-10-20 RX ORDER — PROCHLORPERAZINE MALEATE 5 MG/1
TAKE 1 TABLET BY MOUTH AS NEEDED 3 TIMES A DAY TABLET ORAL
Qty: 180 TABLET | Refills: 1 | Status: ACTIVE | COMMUNITY

## 2023-10-20 RX ORDER — PEPPERMINT OIL 90 MG
AS DIRECTED CAPSULE, DELAYED, AND EXTENDED RELEASE ORAL
Status: ACTIVE | COMMUNITY

## 2023-10-20 RX ORDER — OCTISALATE, AVOBENZONE, HOMOSALATE, AND OCTOCRYLENE 29.4; 29.4; 49; 25.48 MG/ML; MG/ML; MG/ML; MG/ML
AS DIRECTED LOTION TOPICAL
Status: ACTIVE | COMMUNITY

## 2023-10-20 RX ORDER — PANTOPRAZOLE SODIUM 40 MG/1
TAKE 1 TABLET BY MOUTH EVERY DAY FOR 60 DAYS TABLET, DELAYED RELEASE ORAL
Qty: 60 TABLET | Refills: 3 | Status: ACTIVE | COMMUNITY

## 2023-10-20 NOTE — HPI-GERD
68 year old male returns for an OV to discuss chronic gastroparesis with dyspepsia symptoms of nausea and vomiting.  He has a long history of gastritis and stomach bleeding that was further described as gastritis.  We have discussed treatment options with the patient over several months;however, he has been hesistant to start recommended treatment due to percieved side effects.     We discussed use of prokinetic agents, but he read the side effect profiles and wished to try lifestyle modification. However, on subsequent visits he decided to try an antiemetic. We initiated  prochlorperazine 5 mg but the patient admited to not taking the medication.  He has significant anxiety with new medications but he continues to have nausea and abdominal cramping mostly at night.  Trials of FD guard taken intermittently has mixed results.   On the last visit, he agreed to try prochlorperazine 5 mg x bid and has done remarkably well. However, he menitons that he ate a cheeseburger last week that caused signifcant nausea and vomiting.  He continues to take Linzess 72 mcg on T, Th, Sat with mixed results. Endorses cramping but has BMs on the days he takes the medication. Today, he mentions that this medication caused constant diarrhea x 24 hours after medications  Brief history with work up : EGD (5/2012) which revealed a hiatal hernia.   EGD 7/28/2020 showed esophagitis, fundic gland polyps and H Pylori negative gastritis.  Colonoscopy 1/23 showed diverticulosis.  EGD 6/23 revealed hemorrhagic gastritis.  Gastrin level 270 Pill capsule done but the device malfunctioned.   CBC and CMP) in April 2021 were normal. He is not diabetic, denies abdominal surgery or takes pain medication. His symptoms have been ongoing for several years, and he has tried several OTC anti-gas medications such as gas ex, Mylicon, Pepto Bismol.

## 2023-10-23 ENCOUNTER — TELEPHONE ENCOUNTER (OUTPATIENT)
Dept: URBAN - METROPOLITAN AREA CLINIC 48 | Facility: CLINIC | Age: 68
End: 2023-10-23

## 2023-11-21 ENCOUNTER — WEB ENCOUNTER (OUTPATIENT)
Dept: URBAN - METROPOLITAN AREA CLINIC 44 | Facility: CLINIC | Age: 68
End: 2023-11-21

## 2023-12-21 ENCOUNTER — TELEPHONE ENCOUNTER (OUTPATIENT)
Dept: URBAN - METROPOLITAN AREA CLINIC 46 | Facility: CLINIC | Age: 68
End: 2023-12-21

## 2023-12-27 ENCOUNTER — OFFICE VISIT (OUTPATIENT)
Dept: URBAN - METROPOLITAN AREA CLINIC 43 | Facility: CLINIC | Age: 68
End: 2023-12-27
Payer: COMMERCIAL

## 2023-12-27 VITALS
TEMPERATURE: 97.9 F | DIASTOLIC BLOOD PRESSURE: 63 MMHG | WEIGHT: 154.2 LBS | HEIGHT: 68 IN | SYSTOLIC BLOOD PRESSURE: 128 MMHG | HEART RATE: 57 BPM | BODY MASS INDEX: 23.37 KG/M2

## 2023-12-27 DIAGNOSIS — D50.9 ANEMIA: ICD-10-CM

## 2023-12-27 PROCEDURE — 91110 GI TRC IMG INTRAL ESOPH-ILE: CPT | Performed by: INTERNAL MEDICINE

## 2023-12-27 RX ORDER — SUCRALFATE 1 G/1
1 TABLET ON AN EMPTY STOMACH TABLET ORAL TWICE A DAY
Qty: 120 TABLET | Refills: 3 | Status: ACTIVE | COMMUNITY
Start: 2023-10-20 | End: 2024-06-16

## 2023-12-27 RX ORDER — OCTISALATE, AVOBENZONE, HOMOSALATE, AND OCTOCRYLENE 29.4; 29.4; 49; 25.48 MG/ML; MG/ML; MG/ML; MG/ML
AS DIRECTED LOTION TOPICAL
Status: ACTIVE | COMMUNITY

## 2023-12-27 RX ORDER — PEPPERMINT OIL 90 MG
AS DIRECTED CAPSULE, DELAYED, AND EXTENDED RELEASE ORAL
Status: ACTIVE | COMMUNITY

## 2023-12-27 RX ORDER — PANTOPRAZOLE SODIUM 40 MG/1
TAKE 1 TABLET BY MOUTH EVERY DAY FOR 60 DAYS TABLET, DELAYED RELEASE ORAL
Qty: 60 TABLET | Refills: 3 | Status: ACTIVE | COMMUNITY

## 2023-12-27 RX ORDER — PROCHLORPERAZINE MALEATE 5 MG/1
TAKE 1 TABLET BY MOUTH AS NEEDED 3 TIMES A DAY TABLET ORAL
Qty: 180 TABLET | Refills: 1 | Status: ACTIVE | COMMUNITY

## 2023-12-27 RX ORDER — LINACLOTIDE 72 UG/1
TAKE 1 CAPSULE BY MOUTH 30 MIN BEFORE BREAKFAST CAPSULE, GELATIN COATED ORAL ONCE A DAY
Qty: 90 CAPSULE | Refills: 0 | Status: ACTIVE | COMMUNITY

## 2024-01-24 ENCOUNTER — DASHBOARD ENCOUNTERS (OUTPATIENT)
Age: 69
End: 2024-01-24

## 2024-01-26 ENCOUNTER — OFFICE VISIT (OUTPATIENT)
Dept: URBAN - METROPOLITAN AREA CLINIC 46 | Facility: CLINIC | Age: 69
End: 2024-01-26
Payer: COMMERCIAL

## 2024-01-26 VITALS
HEIGHT: 68 IN | DIASTOLIC BLOOD PRESSURE: 73 MMHG | WEIGHT: 157.4 LBS | TEMPERATURE: 98.7 F | BODY MASS INDEX: 23.86 KG/M2 | SYSTOLIC BLOOD PRESSURE: 130 MMHG | HEART RATE: 48 BPM

## 2024-01-26 DIAGNOSIS — K29.60 GASTRITIS, BILE ACID REFLUX: ICD-10-CM

## 2024-01-26 DIAGNOSIS — K31.84 GASTROPARESIS: ICD-10-CM

## 2024-01-26 DIAGNOSIS — K58.1 IRRITABLE BOWEL SYNDROME WITH CONSTIPATION: ICD-10-CM

## 2024-01-26 PROCEDURE — 99213 OFFICE O/P EST LOW 20 MIN: CPT | Performed by: INTERNAL MEDICINE

## 2024-01-26 RX ORDER — PROCHLORPERAZINE MALEATE 5 MG/1
1 TABLET TABLET ORAL
Qty: 180 | Refills: 1

## 2024-01-26 RX ORDER — SUCRALFATE 1 G/1
1 TABLET ON AN EMPTY STOMACH TABLET ORAL TWICE A DAY
Qty: 120 TABLET | Refills: 3 | Status: ACTIVE | COMMUNITY
Start: 2023-10-20 | End: 2024-06-16

## 2024-01-26 RX ORDER — LINACLOTIDE 72 UG/1
TAKE 1 CAPSULE BY MOUTH 30 MIN BEFORE BREAKFAST CAPSULE, GELATIN COATED ORAL ONCE A DAY
Qty: 90 CAPSULE | Refills: 0 | Status: ACTIVE | COMMUNITY

## 2024-01-26 RX ORDER — PEPPERMINT OIL 90 MG
AS DIRECTED CAPSULE, DELAYED, AND EXTENDED RELEASE ORAL
Status: ACTIVE | COMMUNITY

## 2024-01-26 RX ORDER — OCTISALATE, AVOBENZONE, HOMOSALATE, AND OCTOCRYLENE 29.4; 29.4; 49; 25.48 MG/ML; MG/ML; MG/ML; MG/ML
AS DIRECTED LOTION TOPICAL
Status: ACTIVE | COMMUNITY

## 2024-01-26 RX ORDER — PROCHLORPERAZINE MALEATE 5 MG/1
TAKE 1 TABLET BY MOUTH AS NEEDED 3 TIMES A DAY TABLET ORAL
Qty: 180 TABLET | Refills: 1 | Status: ACTIVE | COMMUNITY

## 2024-01-26 RX ORDER — PANTOPRAZOLE SODIUM 40 MG/1
TAKE 1 TABLET BY MOUTH EVERY DAY FOR 60 DAYS TABLET, DELAYED RELEASE ORAL
Qty: 60 TABLET | Refills: 3 | Status: ACTIVE | COMMUNITY

## 2024-01-26 NOTE — HPI-GERD
68 year old male returns for an OV to discuss chronic gastroparesis with dyspepsia symptoms of nausea and vomiting.  He has a long history of gastritis and stomach bleeding that was further described as gastritis.    We discussed use of prokinetic agents, but he read the side effect profiles and wished to try lifestyle modification. However, on subsequent visits he decided to try an antiemetic. We initiated  prochlorperazine 5 mg but the patient admited to not taking the medication.  He has significant anxiety with new medications but he continues to have nausea and abdominal cramping mostly at night.  Trials of FD guard taken intermittently has mixed results.   On the last visit, he agreed to try prochlorperazine 5 mg x bid and had been doing well. However, he mentions that the medication seems to work well but he is not taking the medication consistently.   PPI discontinued as he does not have heartburn. He is no longer taking sucralfate as it was causing constipation.  Weight is up today to 157 lbs.    Brief history with work up : EGD (5/2012) which revealed a hiatal hernia.   EGD 7/28/2020 showed esophagitis, fundic gland polyps and H Pylori negative gastritis.  Colonoscopy 1/23 showed diverticulosis.  EGD 6/23 revealed hemorrhagic gastritis.  Gastrin level 270 10/23 Pill capsule done but the device malfunctioned.  12/27/23 Repeat Pill capsule with excessive bile in stomach but otherwise unremarkable.  He is not diabetic, denies abdominal surgery or takes pain medication. His symptoms have been ongoing for several years, and he has tried several OTC anti-gas medications such as gas ex, Mylicon, Pepto Bismol.

## 2024-07-12 ENCOUNTER — OFFICE VISIT (OUTPATIENT)
Dept: URBAN - METROPOLITAN AREA CLINIC 46 | Facility: CLINIC | Age: 69
End: 2024-07-12
Payer: COMMERCIAL

## 2024-07-12 VITALS
HEIGHT: 68 IN | DIASTOLIC BLOOD PRESSURE: 60 MMHG | WEIGHT: 156.4 LBS | TEMPERATURE: 97.9 F | BODY MASS INDEX: 23.7 KG/M2 | SYSTOLIC BLOOD PRESSURE: 123 MMHG | HEART RATE: 49 BPM

## 2024-07-12 DIAGNOSIS — K31.84 GASTROPARESIS: ICD-10-CM

## 2024-07-12 DIAGNOSIS — K29.60 GASTRITIS, BILE ACID REFLUX: ICD-10-CM

## 2024-07-12 PROCEDURE — 99214 OFFICE O/P EST MOD 30 MIN: CPT | Performed by: INTERNAL MEDICINE

## 2024-07-12 RX ORDER — PROCHLORPERAZINE MALEATE 5 MG/1
1 TABLET TABLET ORAL
Qty: 180 | Refills: 1

## 2024-07-12 RX ORDER — PANTOPRAZOLE SODIUM 40 MG/1
TAKE 1 TABLET BY MOUTH EVERY DAY FOR 60 DAYS TABLET, DELAYED RELEASE ORAL
Qty: 60 TABLET | Refills: 3 | Status: ACTIVE | COMMUNITY

## 2024-07-12 RX ORDER — PROCHLORPERAZINE MALEATE 5 MG/1
1 TABLET TABLET ORAL
Qty: 180 | Refills: 1 | Status: ACTIVE | COMMUNITY

## 2024-07-12 NOTE — HPI-GERD
69 year old male returns for an OV to discuss chronic gastroparesis with dyspepsia symptoms of nausea and vomiting.  He has a long history of gastritis and stomach bleeding that was further described as gastritis.    We discussed use of prokinetic agents, but he read the side effect profiles and wished to try lifestyle modification. However, on subsequent visits he decided to try an antiemetic. We initiated  prochlorperazine 5 mg but the patient admited to not taking the medication.  He has significant anxiety with new medications but he continues to have nausea and abdominal cramping mostly at night.  Trials of FD guard taken intermittently has mixed results.   On the last visit, he agreed to try prochlorperazine 5 mg x bid and had been doing well. However, he mentions that the medication seems to work well but he is not taking the medication consistently.   PPI discontinued as he does not have heartburn. He is no longer taking sucralfate as it was causing constipation.  Weight remains unchanged from last visi at 156 lbs. He mentions that he had  two episodes of bilious vomiting since the last office visit.  Episodes lasted about 1-2 hours, nausea is chronic and unchanged. In addition, he mentions that he re-started Protonix 40 mg x daily to help.    Brief history with work up : EGD (5/2012) which revealed a hiatal hernia.   EGD 7/28/2020 showed esophagitis, fundic gland polyps and H Pylori negative gastritis.  Colonoscopy 1/23 showed diverticulosis.  EGD 6/23 revealed hemorrhagic gastritis.  Gastrin level 270 10/23 Pill capsule done but the device malfunctioned.  12/27/23 Repeat Pill capsule with excessive bile in stomach but otherwise unremarkable.  He is not diabetic, denies abdominal surgery or takes pain medication. His symptoms have been ongoing for several years, and he has tried several OTC anti-gas medications such as gas ex, Mylicon, Pepto Bismol.

## 2024-07-24 ENCOUNTER — ERX REFILL RESPONSE (OUTPATIENT)
Dept: URBAN - METROPOLITAN AREA CLINIC 44 | Facility: CLINIC | Age: 69
End: 2024-07-24

## 2024-07-24 RX ORDER — PANTOPRAZOLE SODIUM 40 MG/1
TAKE 1 TABLET BY MOUTH EVERY DAY TABLET, DELAYED RELEASE ORAL
Qty: 90 TABLET | Refills: 3 | OUTPATIENT

## 2024-07-24 RX ORDER — PANTOPRAZOLE SODIUM 40 MG/1
TAKE 1 TABLET BY MOUTH EVERY DAY TABLET, DELAYED RELEASE ORAL
Qty: 90 TABLET | Refills: 2 | OUTPATIENT

## 2025-01-14 ENCOUNTER — OFFICE VISIT (OUTPATIENT)
Dept: URBAN - METROPOLITAN AREA CLINIC 48 | Facility: CLINIC | Age: 70
End: 2025-01-14
Payer: COMMERCIAL

## 2025-01-14 ENCOUNTER — OFFICE VISIT (OUTPATIENT)
Dept: URBAN - METROPOLITAN AREA CLINIC 44 | Facility: CLINIC | Age: 70
End: 2025-01-14

## 2025-01-14 VITALS
DIASTOLIC BLOOD PRESSURE: 69 MMHG | TEMPERATURE: 97.5 F | HEART RATE: 55 BPM | WEIGHT: 155 LBS | HEIGHT: 68 IN | SYSTOLIC BLOOD PRESSURE: 122 MMHG | BODY MASS INDEX: 23.49 KG/M2

## 2025-01-14 DIAGNOSIS — K29.60 GASTRITIS, BILE ACID REFLUX: ICD-10-CM

## 2025-01-14 DIAGNOSIS — R10.13 DYSPEPSIA: ICD-10-CM

## 2025-01-14 DIAGNOSIS — K31.84 GASTROPARESIS: ICD-10-CM

## 2025-01-14 DIAGNOSIS — K58.1 IRRITABLE BOWEL SYNDROME WITH CONSTIPATION: ICD-10-CM

## 2025-01-14 PROCEDURE — 99213 OFFICE O/P EST LOW 20 MIN: CPT | Performed by: INTERNAL MEDICINE

## 2025-01-14 RX ORDER — PROCHLORPERAZINE MALEATE 5 MG/1
1 TABLET TABLET ORAL
Qty: 180 | Refills: 1 | Status: ACTIVE | COMMUNITY

## 2025-01-14 RX ORDER — PANTOPRAZOLE SODIUM 40 MG/1
TAKE 1 TABLET BY MOUTH EVERY DAY TABLET, DELAYED RELEASE ORAL
Qty: 90 TABLET | Refills: 2 | Status: ACTIVE | COMMUNITY

## 2025-01-14 RX ORDER — FAMOTIDINE 40 MG/1
1 TABLET TABLET, FILM COATED ORAL ONCE A DAY
Qty: 30 | Refills: 5 | OUTPATIENT
Start: 2025-01-14

## 2025-01-14 NOTE — HPI-GERD
69 year old male presents for follow up of chronic gastroparesis with dyspepsia along with symptoms of nausea and vomiting.  He has a long history of gastritis and stomach bleeding that was further described as gastritis.   Prokinetic agents have been discussed, but he is fearful of the side effects and has made lifestyle modifications and is now taking Compazine 5mg BID. Previously, he was not taking it consistently.  Trials of FD guard taken intermittently has mixed results.  The pt was on Protonix but he stopped it due to not having GERD. He did restart it due to having a few episodes of bilious vomiting and it seemed to help. 12/10/24 CBC and CMP are normal. TSH in 5/2024 was normal.  Bowel movements are every day but has incomplete evacuation. Stool is hard.    Brief history with work up : EGD (5/2012) which revealed a hiatal hernia.   EGD 7/28/2020 showed esophagitis, fundic gland polyps and H Pylori negative gastritis.  Colonoscopy 1/23 showed diverticulosis.  EGD 6/23 revealed hemorrhagic gastritis.  Gastrin level 270 10/23 Pill capsule done but the device malfunctioned.  12/27/23 Repeat Pill capsule with excessive bile in stomach but otherwise unremarkable.  He is not diabetic, denies abdominal surgery or takes pain medication. His symptoms have been ongoing for several years, and he has tried several OTC anti-gas medications such as gas ex, Mylicon, Pepto Bismol.

## 2025-01-17 ENCOUNTER — OFFICE VISIT (OUTPATIENT)
Dept: URBAN - METROPOLITAN AREA CLINIC 46 | Facility: CLINIC | Age: 70
End: 2025-01-17

## 2025-03-12 ENCOUNTER — OFFICE VISIT (OUTPATIENT)
Dept: URBAN - METROPOLITAN AREA CLINIC 44 | Facility: CLINIC | Age: 70
End: 2025-03-12
Payer: COMMERCIAL

## 2025-03-12 VITALS
DIASTOLIC BLOOD PRESSURE: 78 MMHG | SYSTOLIC BLOOD PRESSURE: 148 MMHG | HEIGHT: 68 IN | TEMPERATURE: 98.1 F | BODY MASS INDEX: 23.82 KG/M2 | WEIGHT: 157.2 LBS | HEART RATE: 55 BPM

## 2025-03-12 DIAGNOSIS — K64.8 INTERNAL HEMORRHOIDS: ICD-10-CM

## 2025-03-12 DIAGNOSIS — K59.00 CONSTIPATION, UNSPECIFIED: ICD-10-CM

## 2025-03-12 PROCEDURE — 99213 OFFICE O/P EST LOW 20 MIN: CPT

## 2025-03-12 RX ORDER — HYDROCORTISONE 25 MG/G
1 APPLICATION CREAM TOPICAL TWICE A DAY
Qty: 1 | Refills: 3 | OUTPATIENT
Start: 2025-03-12 | End: 2025-03-24

## 2025-03-12 RX ORDER — PROCHLORPERAZINE MALEATE 5 MG/1
1 TABLET TABLET ORAL
Qty: 180 | Refills: 1 | Status: ACTIVE | COMMUNITY

## 2025-03-12 RX ORDER — PANTOPRAZOLE SODIUM 40 MG/1
TAKE 1 TABLET BY MOUTH EVERY DAY TABLET, DELAYED RELEASE ORAL
Qty: 90 TABLET | Refills: 2 | Status: ACTIVE | COMMUNITY

## 2025-03-12 RX ORDER — ALUMINUM ZIRCONIUM OCTACHLOROHYDREX GLY 16 G/100G
1 CAPSULE GEL TOPICAL ONCE A DAY
Status: ACTIVE | COMMUNITY

## 2025-03-12 NOTE — PHYSICAL EXAM GASTROINTESTINAL
Abdomen , soft, nontender, nondistended , no guarding or rigidity , no masses palpable , normal bowel sounds , Liver and Spleen,  no hepatosplenomegaly , liver nontender Rectal, non-thrombosed, non-bleeding internal hemorrhoids noted on exam, chaperone present. No external hemorrhoids noted

## 2025-03-12 NOTE — HPI-TODAY'S VISIT:
69 year old male for hemorrhoid evaluation, referred from PCP. He states he has had worsening hemorrhoids in last 8-10 weeks. Has had some intermittent constipation. Normal bowel habits are usually once per day.  Feels like he has been straining more. Past trial of Linzess that he states was "too potent" and would have near incidents.  Denies abdominal pain. Denies seeing hematochezia, when he wipes or in the commode. Having occasional rectal pain and itching. States he has been using prepH with some relief.  Recently checked for occult blood at his PCP office that he states was normal. He reports he can occasionally push hemorrhoid back in.

## 2025-04-14 ENCOUNTER — OFFICE VISIT (OUTPATIENT)
Dept: URBAN - METROPOLITAN AREA CLINIC 48 | Facility: CLINIC | Age: 70
End: 2025-04-14
Payer: COMMERCIAL

## 2025-04-14 DIAGNOSIS — K64.8 INTERNAL HEMORRHOIDS: ICD-10-CM

## 2025-04-14 DIAGNOSIS — K58.1 IRRITABLE BOWEL SYNDROME WITH CONSTIPATION: ICD-10-CM

## 2025-04-14 DIAGNOSIS — K31.84 GASTROPARESIS: ICD-10-CM

## 2025-04-14 DIAGNOSIS — Z12.11 COLON CANCER SCREENING (LOW/AVERAGE RISK): ICD-10-CM

## 2025-04-14 PROCEDURE — 99214 OFFICE O/P EST MOD 30 MIN: CPT | Performed by: INTERNAL MEDICINE

## 2025-04-14 RX ORDER — PROCHLORPERAZINE MALEATE 5 MG/1
1 TABLET TABLET ORAL
Qty: 180 | Refills: 1 | Status: ACTIVE | COMMUNITY

## 2025-04-14 RX ORDER — PANTOPRAZOLE SODIUM 40 MG/1
1 TABLET TABLET, DELAYED RELEASE ORAL ONCE A DAY
Qty: 90 TABLET | Refills: 2 | Status: ACTIVE | COMMUNITY

## 2025-04-14 RX ORDER — HYDROCORTISONE 25 MG/G
1 APPLICATION CREAM TOPICAL TWICE A DAY
Status: ACTIVE | COMMUNITY

## 2025-04-14 RX ORDER — ALUMINUM ZIRCONIUM OCTACHLOROHYDREX GLY 16 G/100G
1 CAPSULE GEL TOPICAL ONCE A DAY
Status: ACTIVE | COMMUNITY

## 2025-04-14 NOTE — HPI-TODAY'S VISIT:
69 year old male here for f/u on internal hemorrhoids. Last OV 3/12/25 noted internal hemorrhoids, provided perianal topical. He occasionally would notice protruding hemorrhoids, but able to be reduced. He reports since last OV he is doing better. No rectal pain, itching. Does not notice they are there.  He was diagnosed w/ gastroparesis a few years ago. Currently managed on Compro 5 mg twice daily. No N/V.  thinks Metamucil worsens his gastroparesis, so recently stopped. Currently taking Align probiotic. No abd pain.  Noted IBS-C dx in the past. Can have stools daily, occasionally a day w/o. He often has episodes w/ straining due to small hard stools.

## 2025-05-23 ENCOUNTER — WEB ENCOUNTER (OUTPATIENT)
Dept: URBAN - METROPOLITAN AREA CLINIC 44 | Facility: CLINIC | Age: 70
End: 2025-05-23

## 2025-05-23 RX ORDER — PANTOPRAZOLE SODIUM 40 MG/1
1 TABLET TABLET, DELAYED RELEASE ORAL ONCE A DAY
Qty: 90 TABLET | Refills: 2

## 2025-05-27 ENCOUNTER — ERX REFILL RESPONSE (OUTPATIENT)
Dept: URBAN - METROPOLITAN AREA CLINIC 44 | Facility: CLINIC | Age: 70
End: 2025-05-27

## 2025-05-27 RX ORDER — PANTOPRAZOLE SODIUM 40 MG/1
1 TABLET TABLET, DELAYED RELEASE ORAL ONCE A DAY
Qty: 90 TABLET | Refills: 2

## 2025-07-15 ENCOUNTER — OFFICE VISIT (OUTPATIENT)
Dept: URBAN - METROPOLITAN AREA CLINIC 48 | Facility: CLINIC | Age: 70
End: 2025-07-15

## 2025-07-16 ENCOUNTER — OFFICE VISIT (OUTPATIENT)
Dept: URBAN - METROPOLITAN AREA CLINIC 48 | Facility: CLINIC | Age: 70
End: 2025-07-16